# Patient Record
Sex: MALE
[De-identification: names, ages, dates, MRNs, and addresses within clinical notes are randomized per-mention and may not be internally consistent; named-entity substitution may affect disease eponyms.]

---

## 2023-05-23 PROBLEM — Z00.00 ENCOUNTER FOR PREVENTIVE HEALTH EXAMINATION: Status: ACTIVE | Noted: 2023-05-23

## 2023-05-25 ENCOUNTER — APPOINTMENT (OUTPATIENT)
Dept: UROLOGY | Facility: CLINIC | Age: 38
End: 2023-05-25
Payer: COMMERCIAL

## 2023-05-25 VITALS
HEIGHT: 73 IN | BODY MASS INDEX: 29.16 KG/M2 | RESPIRATION RATE: 16 BRPM | WEIGHT: 220 LBS | TEMPERATURE: 98.2 F | HEART RATE: 61 BPM | SYSTOLIC BLOOD PRESSURE: 127 MMHG | DIASTOLIC BLOOD PRESSURE: 86 MMHG

## 2023-05-25 PROCEDURE — 99202 OFFICE O/P NEW SF 15 MIN: CPT

## 2023-05-25 NOTE — HISTORY OF PRESENT ILLNESS
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 He presents today to discuss procedure in detail and to schedule\par \par  05/25/2023: Mr. 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 He has two children, 3 and 7 years old. 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 He desires elective sterilization.  \par \par Assessment: Pt desires elective sterilization. \par \par I counseled the patient extensively with regard to vasectomy today. I discussed the potential risks and benefits of the procedure with the patient including the potential of bleeding and infection. I also discussed the fact that this procedure should be considered irreversible and if there's any question in the patient's mind, I have encouraged him to reconsider his decision to move forward with the procedure. I also discussed the act that he may temporarily be fertile for a period of up to 3 month after undergoing the procedure and that a post vasectomy semen analysis would be required to confirm the absence of sperm in the ejaculate. \par \par I discussed options of performing the procedure either under local anesthesia in the office versus more general IV sedation in he operating room. He has decided to move forward with the procedure under local anesthesia and I will schedule at the earliest available time. During the visit today, the patient signed the initial consent with 30 day lead time for the procedure understanding that if he does ultimately decide to change his mind regarding vasectomy, he can do so any time. \par \par Plan: Pt signed consent form today. Follow up for the procedure.

## 2023-05-25 NOTE — ADDENDUM
[FreeTextEntry1] : This note was authored by Ernesto Antunez working as a scribe for Dr. Tino Seals. I, Dr. Tino Seals have reviewed the content of this note and confirm it is true and accurate. I personally performed the history and physical examination and made all the decisions. 05/25/2023

## 2023-06-01 DIAGNOSIS — Z30.2 ENCOUNTER FOR STERILIZATION: ICD-10-CM

## 2023-06-01 RX ORDER — DIAZEPAM 5 MG/1
5 TABLET ORAL
Qty: 1 | Refills: 0 | Status: ACTIVE | COMMUNITY
Start: 2023-06-01 | End: 1900-01-01

## 2023-06-01 RX ORDER — HYDROCODONE BITARTRATE AND ACETAMINOPHEN 5; 325 MG/1; MG/1
5-325 TABLET ORAL
Qty: 1 | Refills: 0 | Status: ACTIVE | COMMUNITY
Start: 2023-06-01 | End: 1900-01-01

## 2023-06-02 ENCOUNTER — APPOINTMENT (OUTPATIENT)
Dept: UROLOGY | Facility: CLINIC | Age: 38
End: 2023-06-02
Payer: COMMERCIAL

## 2023-06-02 VITALS
WEIGHT: 220 LBS | RESPIRATION RATE: 17 BRPM | HEIGHT: 73 IN | BODY MASS INDEX: 29.16 KG/M2 | HEART RATE: 71 BPM | OXYGEN SATURATION: 98 % | DIASTOLIC BLOOD PRESSURE: 29 MMHG | SYSTOLIC BLOOD PRESSURE: 117 MMHG

## 2023-06-02 PROCEDURE — 55250 REMOVAL OF SPERM DUCT(S): CPT

## 2023-06-03 NOTE — ASSESSMENT
[FreeTextEntry1] : The patient was prepped and draped in a sterile manner, 10 mL of 1% lidocaine (without epinephrine) was injected using a needle along the vas deferens on both right and left side as well as along the median raphae. The right vas deferens was brought towards the median raphae, and a sharped hemostat was used to create a puncture wound in the raphe, just overlying the vas.  The vas deferens was grasped with the vasal ring clamp delivered into the field. 1 to 2 cm of the vas deferens was cleared of the surrounding fascia and vasal vessels both proximally and distally. The vas deferens was transected proximally and distally and the lumen fulgurated. A 1-2 cm section of vas deferens was excised. 2 medium-sized clips were placed both proximally and distally. Hemostasis was assured and the vas was allowed to return to the right hemiscrotum.\par \par An identical procedure was performed on the left side through the same puncture wound in the median raphe.  Hemostasis was assured and the vas was allowed to return to the left hemiscrotum. With the left vas deferens in the field the clips on the right vas deferens were palpated at the site of the Vasectomy. The left vas deferens was then allowed to return into the left hemiscrotum. Both right and left sites of the vasectomy were then palpated through the scrotum.\par \par After the procedure the patient was examined. Blood pressure and pulse were obtained and were at his baseline. There was minimal blood stain on gauze. Discharge instructions were reviewed in the presence of his wife who drove him home. He was discharged home in excellent condition. He knows that he must use contraception until semen analyses demonstrate absence of sperm in a spun specimen. An appointment was made for followup in 3 months. He will call with any questions or concerns.\par \par

## 2023-06-20 ENCOUNTER — APPOINTMENT (OUTPATIENT)
Dept: UROLOGY | Facility: CLINIC | Age: 38
End: 2023-06-20